# Patient Record
Sex: FEMALE | Race: WHITE | Employment: PART TIME | URBAN - METROPOLITAN AREA
[De-identification: names, ages, dates, MRNs, and addresses within clinical notes are randomized per-mention and may not be internally consistent; named-entity substitution may affect disease eponyms.]

---

## 2023-12-11 ENCOUNTER — OFFICE VISIT (OUTPATIENT)
Dept: URGENT CARE | Facility: CLINIC | Age: 33
End: 2023-12-11
Payer: COMMERCIAL

## 2023-12-11 VITALS
SYSTOLIC BLOOD PRESSURE: 141 MMHG | DIASTOLIC BLOOD PRESSURE: 67 MMHG | HEART RATE: 82 BPM | TEMPERATURE: 98 F | RESPIRATION RATE: 18 BRPM | OXYGEN SATURATION: 98 %

## 2023-12-11 DIAGNOSIS — R30.0 DYSURIA: ICD-10-CM

## 2023-12-11 DIAGNOSIS — R80.8 OTHER PROTEINURIA: ICD-10-CM

## 2023-12-11 DIAGNOSIS — N30.00 ACUTE CYSTITIS WITHOUT HEMATURIA: Primary | ICD-10-CM

## 2023-12-11 LAB
SL AMB  POCT GLUCOSE, UA: NEGATIVE
SL AMB LEUKOCYTE ESTERASE,UA: ABNORMAL
SL AMB POCT BILIRUBIN,UA: NEGATIVE
SL AMB POCT BLOOD,UA: ABNORMAL
SL AMB POCT CLARITY,UA: ABNORMAL
SL AMB POCT COLOR,UA: ABNORMAL
SL AMB POCT KETONES,UA: ABNORMAL
SL AMB POCT NITRITE,UA: POSITIVE
SL AMB POCT PH,UA: 6
SL AMB POCT SPECIFIC GRAVITY,UA: 1.02
SL AMB POCT URINE HCG: NEGATIVE
SL AMB POCT URINE PROTEIN: 2000
SL AMB POCT UROBILINOGEN: 1

## 2023-12-11 PROCEDURE — 87077 CULTURE AEROBIC IDENTIFY: CPT | Performed by: NURSE PRACTITIONER

## 2023-12-11 PROCEDURE — 81025 URINE PREGNANCY TEST: CPT | Performed by: NURSE PRACTITIONER

## 2023-12-11 PROCEDURE — 87186 SC STD MICRODIL/AGAR DIL: CPT | Performed by: NURSE PRACTITIONER

## 2023-12-11 PROCEDURE — 99204 OFFICE O/P NEW MOD 45 MIN: CPT | Performed by: NURSE PRACTITIONER

## 2023-12-11 PROCEDURE — S9088 SERVICES PROVIDED IN URGENT: HCPCS | Performed by: NURSE PRACTITIONER

## 2023-12-11 PROCEDURE — 81002 URINALYSIS NONAUTO W/O SCOPE: CPT | Performed by: NURSE PRACTITIONER

## 2023-12-11 PROCEDURE — 87086 URINE CULTURE/COLONY COUNT: CPT | Performed by: NURSE PRACTITIONER

## 2023-12-11 RX ORDER — CEPHALEXIN 500 MG/1
500 CAPSULE ORAL EVERY 12 HOURS SCHEDULED
Qty: 14 CAPSULE | Refills: 0 | Status: SHIPPED | OUTPATIENT
Start: 2023-12-11 | End: 2023-12-18

## 2023-12-11 NOTE — PATIENT INSTRUCTIONS
Your urine shows bacteria. You have been prescribed an antibiotic. You are to take all medication as prescribed. You are to avoid alcohol and caffeine - they are bladder irritants. Drink water. Take tylenol or motrin for pain or fever. You are to download [employer] for the results in 3-5 days. You will be notified if the antibiotic needs changed. Follow up with your PCP in 2-3 days. Go to the ED if symptoms worsen. You have been prescribed cephalexin. You are to take as prescribed. Drink plenty of water  You are a significant amount of protein in your urine. Avoid caffeine, icetea, sodas.   Your pregnancy was negative

## 2023-12-11 NOTE — PROGRESS NOTES
[facility]        NAME: Rodrigue Nunn is a 35 y.o. female  : 1990    MRN: [id number]  DATE: 2023  TIME: 1:27 PM    Assessment and Plan   Acute cystitis without hematuria [N30.00]  1. Acute cystitis without hematuria  cephalexin (KEFLEX) 500 mg capsule      2. Dysuria  POCT urine dip    POCT urine HCG    Urine culture    cephalexin (KEFLEX) 500 mg capsule      3. Other proteinuria  cephalexin (KEFLEX) 500 mg capsule            Patient Instructions       Follow up with PCP in 3-5 days. Proceed to  ER if symptoms worsen. Your urine shows bacteria. You have been prescribed an antibiotic. You are to take all medication as prescribed. You are to avoid alcohol and caffeine - they are bladder irritants. Drink water. Take tylenol or motrin for pain or fever. You are to download [facility] [employer] for the results in 3-5 days. You will be notified if the antibiotic needs changed. Follow up with your PCP in 2-3 days. Go to the ED if symptoms worsen. You have been prescribed cephalexin. You are to take as prescribed. Drink plenty of water  You are a significant amount of protein in your urine. Avoid caffeine, icetea, sodas. Your pregnancy was negative         Chief Complaint     Chief Complaint   Patient presents with    Urinary Tract Infection     C/o pelvic pressure, urinary frequency, urinary urgency, and bilateral lower back pain onset "2 days ago" . Denies assessing for fever. Pt states "I always feel a little confused when I get UTIs and that's how I feel". Pt denies fall/head strike/LOC. Pt denies seeking care prior. Denies any OTC medications. History of Present Illness       This is a 35year old female who states has had pelvic pressure, urinary frequency, urgency and bilateral lower back pain x 2 days. Denies fever, chills, n/v/d. She states she always feels confused when she has a UTI and feels like this now. Denies taking anything for her symptoms. Denies pregnancy. Review of Systems   Review of Systems      Current Medications       Current Outpatient Medications:     cephalexin (KEFLEX) 500 mg capsule, Take 1 capsule (500 mg total) by mouth every 12 (twelve) hours for 7 days, Disp: 14 capsule, Rfl: 0    Current Allergies     Allergies as of 12/11/2023 - Reviewed 12/11/2023   Allergen Reaction Noted    Penicillins GI Intolerance 08/12/2023            The following portions of the patient's history were reviewed and updated as appropriate: allergies, current medications, past family history, past medical history, past social history, past surgical history and problem list.     History reviewed. No pertinent past medical history. History reviewed. No pertinent surgical history. History reviewed. No pertinent family history. Medications have been verified. Objective   /67 (BP Location: Right arm, Patient Position: Sitting)   Pulse 82   Temp 98 °F (36.7 °C) (Temporal)   Resp 18   LMP 11/21/2023 (Approximate)   SpO2 98%   Patient's last menstrual period was 11/21/2023 (approximate). Physical Exam     Physical Exam        Poct urine   2000 protein, large leuks, large blood  trace ketones, positive nitrates,     Preg neg.   No previous Ua cx for review in EMR     Will send for cx

## 2023-12-12 NOTE — RESULT ENCOUNTER NOTE
Urine Culture   >100,000 cfu/ml Gram Negative Dong Enteric Like Abnormal      Waiting on sensitivities

## 2023-12-13 LAB — BACTERIA UR CULT: ABNORMAL

## 2023-12-13 NOTE — RESULT ENCOUNTER NOTE
Urine Culture   >100,000 cfu/ml Escherichia coli Abnormal     Pt prescribed cephalexin. + sensitivity  According to  mychart pt has not seen results.

## 2024-01-10 ENCOUNTER — OFFICE VISIT (OUTPATIENT)
Dept: URGENT CARE | Facility: CLINIC | Age: 34
End: 2024-01-10
Payer: COMMERCIAL

## 2024-01-10 VITALS
OXYGEN SATURATION: 99 % | HEART RATE: 86 BPM | SYSTOLIC BLOOD PRESSURE: 150 MMHG | TEMPERATURE: 99 F | DIASTOLIC BLOOD PRESSURE: 82 MMHG | RESPIRATION RATE: 18 BRPM

## 2024-01-10 DIAGNOSIS — J20.8 ACUTE BRONCHITIS DUE TO OTHER SPECIFIED ORGANISMS: Primary | ICD-10-CM

## 2024-01-10 DIAGNOSIS — Z72.0 TOBACCO USE: ICD-10-CM

## 2024-01-10 PROCEDURE — S9088 SERVICES PROVIDED IN URGENT: HCPCS | Performed by: NURSE PRACTITIONER

## 2024-01-10 PROCEDURE — 99214 OFFICE O/P EST MOD 30 MIN: CPT | Performed by: NURSE PRACTITIONER

## 2024-01-10 RX ORDER — AZITHROMYCIN 250 MG/1
TABLET, FILM COATED ORAL
Qty: 6 TABLET | Refills: 0 | Status: SHIPPED | OUTPATIENT
Start: 2024-01-10 | End: 2024-01-14

## 2024-01-10 RX ORDER — PREDNISONE 10 MG/1
TABLET ORAL
Qty: 24 TABLET | Refills: 0 | Status: SHIPPED | OUTPATIENT
Start: 2024-01-10

## 2024-01-10 NOTE — PATIENT INSTRUCTIONS
You appear to have bronchitis  Take the medication as prescribed. Continue with OTC symptomatic medication.  Follow up with your PCP in 3-5 days  Go to the ED if symptoms worsen

## 2024-01-10 NOTE — PROGRESS NOTES
"  [facility]        NAME: Jennifer Wills is a 33 y.o. female  : 1990    MRN: [id number]  DATE: January 10, 2024  TIME: 11:07 AM    Assessment and Plan   Acute bronchitis due to other specified organisms [J20.8]  1. Acute bronchitis due to other specified organisms  azithromycin (ZITHROMAX) 250 mg tablet    predniSONE 10 mg tablet      2. Tobacco use  azithromycin (ZITHROMAX) 250 mg tablet    predniSONE 10 mg tablet            Patient Instructions       Follow up with PCP in 3-5 days.  Proceed to  ER if symptoms worsen.    You appear to have bronchitis  Take the medication as prescribed. Continue with OTC symptomatic medication.  Follow up with your PCP in 3-5 days  Go to the ED if symptoms worsen        Chief Complaint     Chief Complaint   Patient presents with    Cough     C/o productive cough, nasal congestion, and bilateral ear pressure (left is worse) onset \"3 days\". Denies fever. Denies home COVID testing. Denies any OTC medications today. Denies PCP contact. Refuses covid testing.    Nasal Congestion         History of Present Illness       This is a 33 year old female who states became ill 3 days ago with productive cough, nasal congestion and bilateral ear pressure.  She states she has taken OTC with little relief. Denies fevers, chills, n/v/d.  She states mucous is green. She does smoke. Denies pregnancy.  Denies and declines covid testing.  Denies calling PCP.  PMH is listed.      Cough  Associated symptoms include ear pain and rhinorrhea.       Review of Systems   Review of Systems   HENT:  Positive for congestion, ear pain, rhinorrhea, sinus pressure and sinus pain.    Eyes: Negative.    Respiratory:  Positive for cough.    Cardiovascular: Negative.    Gastrointestinal: Negative.    Endocrine: Negative.    Genitourinary: Negative.    Musculoskeletal: Negative.    Skin: Negative.    Allergic/Immunologic: Negative.    Neurological: Negative.    Hematological: Negative.  "   Psychiatric/Behavioral: Negative.           Current Medications       Current Outpatient Medications:     azithromycin (ZITHROMAX) 250 mg tablet, Take 2 tablets today then 1 tablet daily x 4 days, Disp: 6 tablet, Rfl: 0    predniSONE 10 mg tablet, Take 5 tabs po x 2 days; 4 tabs po x 2 days; 3 tabs po x 1 day; 2 tabs po x 1 day. 1 tab po x 1 day., Disp: 24 tablet, Rfl: 0    Current Allergies     Allergies as of 01/10/2024 - Reviewed 01/10/2024   Allergen Reaction Noted    Amoxicillin GI Intolerance 01/19/2019    Penicillins GI Intolerance 08/12/2023            The following portions of the patient's history were reviewed and updated as appropriate: allergies, current medications, past family history, past medical history, past social history, past surgical history and problem list.     History reviewed. No pertinent past medical history.    History reviewed. No pertinent surgical history.    History reviewed. No pertinent family history.      Medications have been verified.        Objective   /82 (BP Location: Right arm, Patient Position: Sitting)   Pulse 86   Temp 99 °F (37.2 °C) (Temporal)   Resp 18   LMP 01/09/2024 (Exact Date)   SpO2 99%   Patient's last menstrual period was 01/09/2024 (exact date).       Physical Exam     Physical Exam  Constitutional:       General: She is not in acute distress.     Appearance: Normal appearance. She is obese. She is not ill-appearing, toxic-appearing or diaphoretic.   HENT:      Head: Normocephalic and atraumatic.      Right Ear: Tympanic membrane and ear canal normal.      Left Ear: Tympanic membrane and ear canal normal.      Ears:      Comments: Small amount of cerumen in b/l canals      Nose: Congestion present. No rhinorrhea.      Mouth/Throat:      Mouth: Mucous membranes are moist.      Pharynx: Oropharynx is clear. No oropharyngeal exudate or posterior oropharyngeal erythema.   Eyes:      Extraocular Movements: Extraocular movements intact.    Cardiovascular:      Rate and Rhythm: Normal rate and regular rhythm.      Pulses: Normal pulses.      Heart sounds: Normal heart sounds. No murmur heard.  Pulmonary:      Effort: Pulmonary effort is normal. No respiratory distress.      Breath sounds: No stridor. Wheezing present. No rhonchi or rales.      Comments: Decreased through out   Chest:      Chest wall: No tenderness.   Musculoskeletal:         General: Normal range of motion.      Cervical back: Normal range of motion and neck supple.   Skin:     General: Skin is warm and dry.      Capillary Refill: Capillary refill takes less than 2 seconds.   Neurological:      General: No focal deficit present.      Mental Status: She is alert and oriented to person, place, and time.   Psychiatric:         Mood and Affect: Mood normal.         Behavior: Behavior normal.         Thought Content: Thought content normal.         Judgment: Judgment normal.

## 2024-01-10 NOTE — LETTER
January 10, 2024     Patient: Jennifer Wills   YOB: 1990   Date of Visit: 1/10/2024       To Whom It May Concern:    It is my medical opinion that Jennifer Wills may return to work on 1/11/2024 .    If you have any questions or concerns, please don't hesitate to call.         Sincerely,        MITCHELL Murphy    CC: No Recipients

## 2024-05-29 ENCOUNTER — VBI (OUTPATIENT)
Dept: ADMINISTRATIVE | Facility: OTHER | Age: 34
End: 2024-05-29